# Patient Record
Sex: MALE | Race: BLACK OR AFRICAN AMERICAN | Employment: UNEMPLOYED | URBAN - METROPOLITAN AREA
[De-identification: names, ages, dates, MRNs, and addresses within clinical notes are randomized per-mention and may not be internally consistent; named-entity substitution may affect disease eponyms.]

---

## 2023-03-07 ENCOUNTER — HOSPITAL ENCOUNTER (EMERGENCY)
Age: 38
Discharge: HOME | End: 2023-03-07
Attending: EMERGENCY MEDICINE
Payer: MEDICAID

## 2023-03-07 VITALS
HEART RATE: 90 BPM | TEMPERATURE: 97.9 F | HEIGHT: 72 IN | RESPIRATION RATE: 20 BRPM | SYSTOLIC BLOOD PRESSURE: 138 MMHG | WEIGHT: 220.46 LBS | DIASTOLIC BLOOD PRESSURE: 79 MMHG | OXYGEN SATURATION: 98 % | BODY MASS INDEX: 29.86 KG/M2

## 2023-03-07 DIAGNOSIS — Z76.0 MEDICATION REFILL: Primary | ICD-10-CM

## 2023-03-07 PROCEDURE — 99283 EMERGENCY DEPT VISIT LOW MDM: CPT

## 2023-03-07 RX ORDER — OLANZAPINE 5 MG/1
5 TABLET ORAL
Qty: 30 TABLET | Refills: 0 | Status: SHIPPED | OUTPATIENT
Start: 2023-03-07

## 2023-03-07 RX ORDER — OLANZAPINE 5 MG/1
5 TABLET ORAL
Qty: 30 TABLET | Refills: 0 | Status: SHIPPED | OUTPATIENT
Start: 2023-03-07 | End: 2023-03-07 | Stop reason: SDUPTHER

## 2023-03-07 NOTE — ED PROVIDER NOTES
[facility] EMERGENCY DEPT  EMERGENCY DEPARTMENT ENCOUNTER       Pt Name: Adriana Atwood  MRN: [id number]  [place] 1985  Date of evaluation: 3/7/2023  Provider: Emir Garcia DO   PCP: Geno Woodard MD  Note Started: 2:56 PM 3/7/23     CHIEF COMPLAINT       Chief Complaint   Patient presents with    Medication Refill     Olanzapine - psychiatry isnt available until the end of this month        HISTORY OF PRESENT ILLNESS: 1 or more elements      History From: patient, History limited by: none     Adriana Atwood is a 40 y.o. male medication refill. Patient states he is here from [place] had recently seen a psychiatrist secondary to depression with some psychotic symptoms. Patient was started on olanzapine 5 mg. Patient had received a prescription for 15 tablets and is not scheduled to see the psychiatrist until the end of the month. Patient states that he is here to visiting and took his last pill last night. Patient was requesting refill. He denies any current SI or HI. He denies any delusional thoughts. Please See MDM for Additional Details of the HPI/PMH  Nursing Notes were all reviewed and agreed with or any disagreements were addressed in the HPI. REVIEW OF SYSTEMS        Positives and Pertinent negatives as per HPI. PAST HISTORY     Past Medical History:  Depression    Past Surgical History:  none    Family History:  none    Social History: Allergies:   Allergies   Allergen Reactions    Flu Vac 2012 (18-64yrs)(Pf) Other (comments)    Hepatitis B Vacc-Cpg 1018 (Pf) Other (comments)       CURRENT MEDICATIONS      Current Discharge Medication List          SCREENINGS               No data recorded         PHYSICAL EXAM      ED Triage Vitals [03/07/23 1355]   ED Encounter Vitals Group      /79      Pulse (Heart Rate) 90      Resp Rate 20      Temp 97.9 °F (36.6 °C)      Temp src       O2 Sat (%) 98 %      Weight 220 lb 7.4 oz      Height 5' 11.5\"        Physical Exam  Vitals and nursing note reviewed. Constitutional:       General: He is not in acute distress. Appearance: Normal appearance. He is not ill-appearing. HENT:      Head: Normocephalic and atraumatic. Mouth/Throat:      Mouth: Mucous membranes are moist.   Eyes:      Extraocular Movements: Extraocular movements intact. Conjunctiva/sclera: Conjunctivae normal.      Pupils: Pupils are equal, round, and reactive to light. Cardiovascular:      Rate and Rhythm: Normal rate and regular rhythm. Pulmonary:      Effort: Pulmonary effort is normal. No respiratory distress. Musculoskeletal:      Cervical back: Normal range of motion and neck supple. Skin:     General: Skin is warm and dry. Neurological:      Mental Status: He is alert. Psychiatric:         Mood and Affect: Mood normal.         Behavior: Behavior normal.         Thought Content: Thought content normal.         Judgment: Judgment normal.        DIAGNOSTIC RESULTS   LABS:  None    EKG: None     RADIOLOGY:  None     PROCEDURES   Unless otherwise noted below, none  Procedures     CRITICAL CARE TIME   None    EMERGENCY DEPARTMENT COURSE and DIFFERENTIAL DIAGNOSIS/MDM   Vitals:    Vitals:    03/07/23 1355   BP: 138/79   Pulse: 90   Resp: 20   Temp: 97.9 °F (36.6 °C)   SpO2: 98%   Weight: 100 kg (220 lb 7.4 oz)   Height: 5' 11.5\" (1.816 m)        Patient was given the following medications:  Medications - No data to display    Medical Decision Making  Risk  Prescription drug management. Patient has his prescription bottle with him was prescribed 15 pills he is not feeling in it prior to running out from taking too many. Patient is not due to see his psychiatrist until the end of March we will write a prescription for 30-day supply to prevent patient having withdrawals from vomiting out of his medication. FINAL IMPRESSION     1. Medication refill          DISPOSITION/PLAN   Lillie Chester's  results have been reviewed with him.   He has been counseled regarding his diagnosis, treatment, and plan. He verbally conveys understanding and agreement of the signs, symptoms, diagnosis, treatment and prognosis and additionally agrees to follow up as discussed. He also agrees with the care-plan and conveys that all of his questions have been answered. I have also provided discharge instructions for him that include: educational information regarding their diagnosis and treatment, and list of reasons why they would want to return to the ED prior to their follow-up appointment, should his condition change. CLINICAL IMPRESSION    Discharge Note: The patient is stable for discharge home. The signs, symptoms, diagnosis, and discharge instructions have been discussed, understanding conveyed, and agreed upon. The patient is to follow up as recommended or return to ER should their symptoms worsen. PATIENT REFERRED TO:  Follow-up Information    None           DISCHARGE MEDICATIONS:  Current Discharge Medication List        START taking these medications    Details   OLANZapine (ZyPREXA) 5 mg tablet Take 1 Tablet by mouth nightly. Qty: 30 Tablet, Refills: 0  Start date: 3/7/2023               DISCONTINUED MEDICATIONS:  Current Discharge Medication List          I am the Primary Clinician of Record. Chago Sarabia DO (electronically signed)    (Please note that parts of this dictation were completed with voice recognition software. Quite often unanticipated grammatical, syntax, homophones, and other interpretive errors are inadvertently transcribed by the computer software. Please disregards these errors.  Please excuse any errors that have escaped final proofreading.)